# Patient Record
Sex: MALE | Race: OTHER | HISPANIC OR LATINO | Employment: FULL TIME | URBAN - METROPOLITAN AREA
[De-identification: names, ages, dates, MRNs, and addresses within clinical notes are randomized per-mention and may not be internally consistent; named-entity substitution may affect disease eponyms.]

---

## 2019-01-01 ENCOUNTER — APPOINTMENT (OUTPATIENT)
Dept: RADIOLOGY | Facility: MEDICAL CENTER | Age: 34
DRG: 132 | End: 2019-01-01
Attending: EMERGENCY MEDICINE

## 2019-01-01 ENCOUNTER — HOSPITAL ENCOUNTER (INPATIENT)
Facility: MEDICAL CENTER | Age: 34
LOS: 1 days | DRG: 132 | End: 2019-01-02
Attending: EMERGENCY MEDICINE | Admitting: PLASTIC SURGERY

## 2019-01-01 DIAGNOSIS — S02.609B: ICD-10-CM

## 2019-01-01 PROCEDURE — 501838 HCHG SUTURE GENERAL: Performed by: PLASTIC SURGERY

## 2019-01-01 PROCEDURE — 99285 EMERGENCY DEPT VISIT HI MDM: CPT

## 2019-01-01 PROCEDURE — 302135 SEQUENTIAL COMPRESSION MACHINE: Performed by: PLASTIC SURGERY

## 2019-01-01 PROCEDURE — 0NSV04Z REPOSITION LEFT MANDIBLE WITH INTERNAL FIXATION DEVICE, OPEN APPROACH: ICD-10-PCS | Performed by: PLASTIC SURGERY

## 2019-01-01 PROCEDURE — 160048 HCHG OR STATISTICAL LEVEL 1-5: Performed by: PLASTIC SURGERY

## 2019-01-01 PROCEDURE — 70486 CT MAXILLOFACIAL W/O DYE: CPT

## 2019-01-01 PROCEDURE — 3E0234Z INTRODUCTION OF SERUM, TOXOID AND VACCINE INTO MUSCLE, PERCUTANEOUS APPROACH: ICD-10-PCS | Performed by: EMERGENCY MEDICINE

## 2019-01-01 PROCEDURE — 700111 HCHG RX REV CODE 636 W/ 250 OVERRIDE (IP)

## 2019-01-01 PROCEDURE — 160009 HCHG ANES TIME/MIN: Performed by: PLASTIC SURGERY

## 2019-01-01 PROCEDURE — 700102 HCHG RX REV CODE 250 W/ 637 OVERRIDE(OP): Performed by: PLASTIC SURGERY

## 2019-01-01 PROCEDURE — G0378 HOSPITAL OBSERVATION PER HR: HCPCS

## 2019-01-01 PROCEDURE — 96365 THER/PROPH/DIAG IV INF INIT: CPT

## 2019-01-01 PROCEDURE — C1713 ANCHOR/SCREW BN/BN,TIS/BN: HCPCS | Performed by: PLASTIC SURGERY

## 2019-01-01 PROCEDURE — 700111 HCHG RX REV CODE 636 W/ 250 OVERRIDE (IP): Performed by: PLASTIC SURGERY

## 2019-01-01 PROCEDURE — 700105 HCHG RX REV CODE 258: Performed by: PLASTIC SURGERY

## 2019-01-01 PROCEDURE — 700111 HCHG RX REV CODE 636 W/ 250 OVERRIDE (IP): Performed by: EMERGENCY MEDICINE

## 2019-01-01 PROCEDURE — 770006 HCHG ROOM/CARE - MED/SURG/GYN SEMI*

## 2019-01-01 PROCEDURE — 160036 HCHG PACU - EA ADDL 30 MINS PHASE I: Performed by: PLASTIC SURGERY

## 2019-01-01 PROCEDURE — 90715 TDAP VACCINE 7 YRS/> IM: CPT | Performed by: EMERGENCY MEDICINE

## 2019-01-01 PROCEDURE — 90471 IMMUNIZATION ADMIN: CPT

## 2019-01-01 PROCEDURE — 160002 HCHG RECOVERY MINUTES (STAT): Performed by: PLASTIC SURGERY

## 2019-01-01 PROCEDURE — 700105 HCHG RX REV CODE 258: Performed by: EMERGENCY MEDICINE

## 2019-01-01 PROCEDURE — 160041 HCHG SURGERY MINUTES - EA ADDL 1 MIN LEVEL 4: Performed by: PLASTIC SURGERY

## 2019-01-01 PROCEDURE — 0NST04Z REPOSITION RIGHT MANDIBLE WITH INTERNAL FIXATION DEVICE, OPEN APPROACH: ICD-10-PCS | Performed by: PLASTIC SURGERY

## 2019-01-01 PROCEDURE — 160029 HCHG SURGERY MINUTES - 1ST 30 MINS LEVEL 4: Performed by: PLASTIC SURGERY

## 2019-01-01 PROCEDURE — 700101 HCHG RX REV CODE 250: Performed by: PLASTIC SURGERY

## 2019-01-01 PROCEDURE — 502000 HCHG MISC OR IMPLANTS RC 0278: Performed by: PLASTIC SURGERY

## 2019-01-01 PROCEDURE — A9270 NON-COVERED ITEM OR SERVICE: HCPCS | Performed by: PLASTIC SURGERY

## 2019-01-01 PROCEDURE — 700101 HCHG RX REV CODE 250

## 2019-01-01 PROCEDURE — 96375 TX/PRO/DX INJ NEW DRUG ADDON: CPT

## 2019-01-01 PROCEDURE — 160035 HCHG PACU - 1ST 60 MINS PHASE I: Performed by: PLASTIC SURGERY

## 2019-01-01 DEVICE — PLATE TI SHORT MATRIXWAVE MMF 10 HOLE (2TX6=12): Type: IMPLANTABLE DEVICE | Status: FUNCTIONAL

## 2019-01-01 DEVICE — SCREW TI SELF DRILLING MATRIX WAVE MMF 1.85MM (5EA/PK) (2TX60=120): Type: IMPLANTABLE DEVICE | Status: FUNCTIONAL

## 2019-01-01 DEVICE — PLATE TI TALL MATRIXWAVE MMF 10 HOLE (2TX6=12): Type: IMPLANTABLE DEVICE | Status: FUNCTIONAL

## 2019-01-01 DEVICE — SUTURE SIZE 1 MONO - (12TB/BX): Type: IMPLANTABLE DEVICE | Status: FUNCTIONAL

## 2019-01-01 RX ORDER — BUPIVACAINE HYDROCHLORIDE AND EPINEPHRINE 5; 5 MG/ML; UG/ML
INJECTION, SOLUTION EPIDURAL; INTRACAUDAL; PERINEURAL
Status: DISCONTINUED | OUTPATIENT
Start: 2019-01-01 | End: 2019-01-01 | Stop reason: HOSPADM

## 2019-01-01 RX ORDER — HYDROMORPHONE HYDROCHLORIDE 1 MG/ML
0.1 INJECTION, SOLUTION INTRAMUSCULAR; INTRAVENOUS; SUBCUTANEOUS
Status: DISCONTINUED | OUTPATIENT
Start: 2019-01-01 | End: 2019-01-01 | Stop reason: HOSPADM

## 2019-01-01 RX ORDER — CEFAZOLIN SODIUM 1 G/50ML
1 INJECTION, SOLUTION INTRAVENOUS ONCE
Status: COMPLETED | OUTPATIENT
Start: 2019-01-01 | End: 2019-01-01

## 2019-01-01 RX ORDER — OXYCODONE HCL 5 MG/5 ML
10 SOLUTION, ORAL ORAL
Status: DISCONTINUED | OUTPATIENT
Start: 2019-01-01 | End: 2019-01-01 | Stop reason: HOSPADM

## 2019-01-01 RX ORDER — HYDROMORPHONE HYDROCHLORIDE 1 MG/ML
0.5 INJECTION, SOLUTION INTRAMUSCULAR; INTRAVENOUS; SUBCUTANEOUS
Status: DISCONTINUED | OUTPATIENT
Start: 2019-01-01 | End: 2019-01-02 | Stop reason: HOSPADM

## 2019-01-01 RX ORDER — OXYCODONE HCL 5 MG/5 ML
5 SOLUTION, ORAL ORAL
Status: DISCONTINUED | OUTPATIENT
Start: 2019-01-01 | End: 2019-01-01 | Stop reason: HOSPADM

## 2019-01-01 RX ORDER — DEXTROSE MONOHYDRATE, SODIUM CHLORIDE, AND POTASSIUM CHLORIDE 50; 1.49; 4.5 G/1000ML; G/1000ML; G/1000ML
INJECTION, SOLUTION INTRAVENOUS EVERY 6 HOURS
Status: COMPLETED | OUTPATIENT
Start: 2019-01-01 | End: 2019-01-01

## 2019-01-01 RX ORDER — HALOPERIDOL 5 MG/ML
1 INJECTION INTRAMUSCULAR
Status: DISCONTINUED | OUTPATIENT
Start: 2019-01-01 | End: 2019-01-01 | Stop reason: HOSPADM

## 2019-01-01 RX ORDER — HYDROMORPHONE HYDROCHLORIDE 1 MG/ML
0.2 INJECTION, SOLUTION INTRAMUSCULAR; INTRAVENOUS; SUBCUTANEOUS
Status: DISCONTINUED | OUTPATIENT
Start: 2019-01-01 | End: 2019-01-01 | Stop reason: HOSPADM

## 2019-01-01 RX ORDER — ONDANSETRON 2 MG/ML
4 INJECTION INTRAMUSCULAR; INTRAVENOUS EVERY 6 HOURS PRN
Status: DISCONTINUED | OUTPATIENT
Start: 2019-01-01 | End: 2019-01-02 | Stop reason: HOSPADM

## 2019-01-01 RX ORDER — ONDANSETRON 2 MG/ML
4 INJECTION INTRAMUSCULAR; INTRAVENOUS
Status: DISCONTINUED | OUTPATIENT
Start: 2019-01-01 | End: 2019-01-01 | Stop reason: HOSPADM

## 2019-01-01 RX ORDER — DIPHENHYDRAMINE HYDROCHLORIDE 50 MG/ML
12.5 INJECTION INTRAMUSCULAR; INTRAVENOUS
Status: DISCONTINUED | OUTPATIENT
Start: 2019-01-01 | End: 2019-01-01 | Stop reason: HOSPADM

## 2019-01-01 RX ORDER — CHLORHEXIDINE GLUCONATE ORAL RINSE 1.2 MG/ML
15 SOLUTION DENTAL 4 TIMES DAILY
Status: DISCONTINUED | OUTPATIENT
Start: 2019-01-01 | End: 2019-01-02 | Stop reason: HOSPADM

## 2019-01-01 RX ORDER — DEXAMETHASONE SODIUM PHOSPHATE 4 MG/ML
4 INJECTION, SOLUTION INTRA-ARTICULAR; INTRALESIONAL; INTRAMUSCULAR; INTRAVENOUS; SOFT TISSUE
Status: DISCONTINUED | OUTPATIENT
Start: 2019-01-01 | End: 2019-01-02 | Stop reason: HOSPADM

## 2019-01-01 RX ORDER — MORPHINE SULFATE 4 MG/ML
4 INJECTION, SOLUTION INTRAMUSCULAR; INTRAVENOUS ONCE
Status: COMPLETED | OUTPATIENT
Start: 2019-01-01 | End: 2019-01-01

## 2019-01-01 RX ORDER — MORPHINE SULFATE 4 MG/ML
4 INJECTION, SOLUTION INTRAMUSCULAR; INTRAVENOUS EVERY 4 HOURS PRN
Status: DISCONTINUED | OUTPATIENT
Start: 2019-01-01 | End: 2019-01-01

## 2019-01-01 RX ORDER — KETOROLAC TROMETHAMINE 30 MG/ML
30 INJECTION, SOLUTION INTRAMUSCULAR; INTRAVENOUS EVERY 6 HOURS
Status: DISCONTINUED | OUTPATIENT
Start: 2019-01-01 | End: 2019-01-02 | Stop reason: HOSPADM

## 2019-01-01 RX ORDER — SODIUM CHLORIDE, SODIUM LACTATE, POTASSIUM CHLORIDE, CALCIUM CHLORIDE 600; 310; 30; 20 MG/100ML; MG/100ML; MG/100ML; MG/100ML
INJECTION, SOLUTION INTRAVENOUS CONTINUOUS
Status: DISCONTINUED | OUTPATIENT
Start: 2019-01-01 | End: 2019-01-01 | Stop reason: HOSPADM

## 2019-01-01 RX ORDER — SODIUM CHLORIDE 9 MG/ML
INJECTION, SOLUTION INTRAVENOUS
Status: ACTIVE
Start: 2019-01-01 | End: 2019-01-02

## 2019-01-01 RX ORDER — SODIUM CHLORIDE, SODIUM LACTATE, POTASSIUM CHLORIDE, CALCIUM CHLORIDE 600; 310; 30; 20 MG/100ML; MG/100ML; MG/100ML; MG/100ML
1000 INJECTION, SOLUTION INTRAVENOUS CONTINUOUS
Status: DISCONTINUED | OUTPATIENT
Start: 2019-01-01 | End: 2019-01-01

## 2019-01-01 RX ORDER — OXYCODONE HYDROCHLORIDE 10 MG/1
10 TABLET ORAL
Status: DISCONTINUED | OUTPATIENT
Start: 2019-01-01 | End: 2019-01-02 | Stop reason: HOSPADM

## 2019-01-01 RX ORDER — HYDROMORPHONE HYDROCHLORIDE 1 MG/ML
0.4 INJECTION, SOLUTION INTRAMUSCULAR; INTRAVENOUS; SUBCUTANEOUS
Status: DISCONTINUED | OUTPATIENT
Start: 2019-01-01 | End: 2019-01-01 | Stop reason: HOSPADM

## 2019-01-01 RX ADMIN — ONDANSETRON 4 MG: 2 INJECTION INTRAMUSCULAR; INTRAVENOUS at 21:04

## 2019-01-01 RX ADMIN — SODIUM CHLORIDE 3 G: 900 INJECTION INTRAVENOUS at 17:28

## 2019-01-01 RX ADMIN — SODIUM CHLORIDE 3 G: 900 INJECTION INTRAVENOUS at 13:19

## 2019-01-01 RX ADMIN — SODIUM CHLORIDE 3 G: 900 INJECTION INTRAVENOUS at 23:46

## 2019-01-01 RX ADMIN — SODIUM CHLORIDE, POTASSIUM CHLORIDE, SODIUM LACTATE AND CALCIUM CHLORIDE 1000 ML: 600; 310; 30; 20 INJECTION, SOLUTION INTRAVENOUS at 04:27

## 2019-01-01 RX ADMIN — OXYCODONE HYDROCHLORIDE 10 MG: 10 TABLET ORAL at 15:27

## 2019-01-01 RX ADMIN — POTASSIUM CHLORIDE, DEXTROSE MONOHYDRATE AND SODIUM CHLORIDE: 150; 5; 450 INJECTION, SOLUTION INTRAVENOUS at 12:00

## 2019-01-01 RX ADMIN — CHLORHEXIDINE GLUCONATE 15 ML: 1.2 RINSE ORAL at 19:51

## 2019-01-01 RX ADMIN — CHLORHEXIDINE GLUCONATE 15 ML: 1.2 RINSE ORAL at 13:18

## 2019-01-01 RX ADMIN — MORPHINE SULFATE 4 MG: 4 INJECTION INTRAVENOUS at 03:29

## 2019-01-01 RX ADMIN — CLOSTRIDIUM TETANI TOXOID ANTIGEN (FORMALDEHYDE INACTIVATED), CORYNEBACTERIUM DIPHTHERIAE TOXOID ANTIGEN (FORMALDEHYDE INACTIVATED), BORDETELLA PERTUSSIS TOXOID ANTIGEN (GLUTARALDEHYDE INACTIVATED), BORDETELLA PERTUSSIS FILAMENTOUS HEMAGGLUTININ ANTIGEN (FORMALDEHYDE INACTIVATED), BORDETELLA PERTUSSIS PERTACTIN ANTIGEN, AND BORDETELLA PERTUSSIS FIMBRIAE 2/3 ANTIGEN 0.5 ML: 5; 2; 2.5; 5; 3; 5 INJECTION, SUSPENSION INTRAMUSCULAR at 04:23

## 2019-01-01 RX ADMIN — CEFAZOLIN SODIUM 1 G: 1 INJECTION, SOLUTION INTRAVENOUS at 03:40

## 2019-01-01 RX ADMIN — CHLORHEXIDINE GLUCONATE 15 ML: 1.2 RINSE ORAL at 17:29

## 2019-01-01 RX ADMIN — OXYCODONE HYDROCHLORIDE 10 MG: 10 TABLET ORAL at 21:04

## 2019-01-01 RX ADMIN — KETOROLAC TROMETHAMINE 30 MG: 30 INJECTION, SOLUTION INTRAMUSCULAR at 17:29

## 2019-01-01 RX ADMIN — KETOROLAC TROMETHAMINE 30 MG: 30 INJECTION, SOLUTION INTRAMUSCULAR at 23:47

## 2019-01-01 ASSESSMENT — COGNITIVE AND FUNCTIONAL STATUS - GENERAL
MOVING TO AND FROM BED TO CHAIR: A LITTLE
MOBILITY SCORE: 18
TURNING FROM BACK TO SIDE WHILE IN FLAT BAD: A LITTLE
PERSONAL GROOMING: A LITTLE
SUGGESTED CMS G CODE MODIFIER MOBILITY: CK
DAILY ACTIVITIY SCORE: 18
DRESSING REGULAR UPPER BODY CLOTHING: A LITTLE
DRESSING REGULAR LOWER BODY CLOTHING: A LITTLE
SUGGESTED CMS G CODE MODIFIER DAILY ACTIVITY: CK
MOVING FROM LYING ON BACK TO SITTING ON SIDE OF FLAT BED: A LITTLE
CLIMB 3 TO 5 STEPS WITH RAILING: A LITTLE
STANDING UP FROM CHAIR USING ARMS: A LITTLE
HELP NEEDED FOR BATHING: A LITTLE
TOILETING: A LITTLE
WALKING IN HOSPITAL ROOM: A LITTLE
EATING MEALS: A LITTLE

## 2019-01-01 ASSESSMENT — COPD QUESTIONNAIRES
COPD SCREENING SCORE: 0
DO YOU EVER COUGH UP ANY MUCUS OR PHLEGM?: NO/ONLY WITH OCCASIONAL COLDS OR INFECTIONS
DURING THE PAST 4 WEEKS HOW MUCH DID YOU FEEL SHORT OF BREATH: NONE/LITTLE OF THE TIME
IN THE PAST 12 MONTHS DO YOU DO LESS THAN YOU USED TO BECAUSE OF YOUR BREATHING PROBLEMS: DISAGREE/UNSURE
HAVE YOU SMOKED AT LEAST 100 CIGARETTES IN YOUR ENTIRE LIFE: NO/DON'T KNOW

## 2019-01-01 ASSESSMENT — PATIENT HEALTH QUESTIONNAIRE - PHQ9
2. FEELING DOWN, DEPRESSED, IRRITABLE, OR HOPELESS: NOT AT ALL
SUM OF ALL RESPONSES TO PHQ9 QUESTIONS 1 AND 2: 0
1. LITTLE INTEREST OR PLEASURE IN DOING THINGS: NOT AT ALL
SUM OF ALL RESPONSES TO PHQ9 QUESTIONS 1 AND 2: 0
2. FEELING DOWN, DEPRESSED, IRRITABLE, OR HOPELESS: NOT AT ALL
1. LITTLE INTEREST OR PLEASURE IN DOING THINGS: NOT AT ALL

## 2019-01-01 ASSESSMENT — ENCOUNTER SYMPTOMS
HEADACHES: 0
ABDOMINAL PAIN: 0
NAUSEA: 0
TINGLING: 0
LOSS OF CONSCIOUSNESS: 0
SORE THROAT: 0
VOMITING: 0

## 2019-01-01 ASSESSMENT — LIFESTYLE VARIABLES
EVER_SMOKED: NEVER
DO YOU DRINK ALCOHOL: NO

## 2019-01-01 ASSESSMENT — PAIN SCALES - GENERAL
PAINLEVEL_OUTOF10: 3
PAINLEVEL_OUTOF10: 0
PAINLEVEL_OUTOF10: 3
PAINLEVEL_OUTOF10: 6
PAINLEVEL_OUTOF10: 7
PAINLEVEL_OUTOF10: 0
PAINLEVEL_OUTOF10: 3
PAINLEVEL_OUTOF10: 0
PAINLEVEL_OUTOF10: 5
PAINLEVEL_OUTOF10: 8
PAINLEVEL_OUTOF10: 5

## 2019-01-01 NOTE — PROGRESS NOTES
Patient arrived to floor and place in room. Patient refused to answer questions for admission due to mouth hurting. Rn explained to patient that he is not to eat or drink anything due to surgery being planned. Patient expresses dissatisfaction with current NPO status. Patient was instructed to call before getting out of bed or needs by using the call light. Patient states understanding.

## 2019-01-01 NOTE — ED NOTES
ERP notified patient at bedside that patient has been accepted by  who will be doing surgery today. Pt verbalized understanding of POC. Continue to monitor.

## 2019-01-01 NOTE — ED TRIAGE NOTES
This RN brought patient back from waiting room. Pt is alert, oriented and ambulatory with steady gait. Pt updated on POC. Pt verbalized understanding.

## 2019-01-01 NOTE — PROGRESS NOTES
Pt seen and examined  Full note is dictated  Pt is scheduled for interdental fixation later this AM, he has had his questions answered
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
## 2019-01-01 NOTE — OR SURGEON
Immediate Post OP Note    PreOp Diagnosis: open fracture of bilateral mandible angles    PostOp Diagnosis: same    Procedure(s):  Open reduction of mandible fracture with interdental fixation  Surgeon(s):  Iron Carroll M.D.    Anesthesiologist/Type of Anesthesia:  Anesthesiologist: Rigo Son M.D./General    Surgical Staff:  Circulator: Amaury Bansal R.N.  Scrub Person: Jose Solano    Specimens removed if any:  * No specimens in log *    Estimated Blood Loss: 10 ml    Findings: displaced overlapping bone    Complications: none        1/1/2019 10:46 AM Iron Carroll M.D.

## 2019-01-01 NOTE — OR NURSING
Patient transported to T304-1 with transport and this RN. Wire cutters on chart, portable suction on bed. Transport uneventful, bedside handoff completed with Narda RN
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
## 2019-01-01 NOTE — PROGRESS NOTES
Report received from FELIPE Krishna  Pt arrived on the floor. Full assessment complete.  in place. Suction at bedside, pt able to demonstrate proper use. Wire cutters placed in visible area, education done on use with pt. Post op VSs implemented

## 2019-01-01 NOTE — OR NURSING
Report called to Narda WANG. Plan of care discussed. Patient denies pain or nausea at this time. Suction remains in hand. Tolerating water without difficulty. Oxymask in place, HOB elevated, jaw bra in place. Patient states all needs are met at this time. VSS. Patient expresses desire to return to room.

## 2019-01-01 NOTE — OR NURSING
Jaw bra placed on patient. Suction set up, in hand of patient. Demonstrated proper use. Patient verbalized understanding. Patient tolerated water without difficulty. No complaints of nausea. Wire clippers taped to front of chart. Patient with HOB in upright position, oxymask in place.
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
## 2019-01-02 VITALS
SYSTOLIC BLOOD PRESSURE: 129 MMHG | HEART RATE: 72 BPM | TEMPERATURE: 98.1 F | HEIGHT: 67 IN | DIASTOLIC BLOOD PRESSURE: 80 MMHG | WEIGHT: 192.9 LBS | RESPIRATION RATE: 17 BRPM | OXYGEN SATURATION: 95 % | BODY MASS INDEX: 30.28 KG/M2

## 2019-01-02 PROCEDURE — 700111 HCHG RX REV CODE 636 W/ 250 OVERRIDE (IP): Performed by: PLASTIC SURGERY

## 2019-01-02 PROCEDURE — 700105 HCHG RX REV CODE 258: Performed by: PLASTIC SURGERY

## 2019-01-02 PROCEDURE — A9270 NON-COVERED ITEM OR SERVICE: HCPCS | Performed by: PLASTIC SURGERY

## 2019-01-02 PROCEDURE — 700102 HCHG RX REV CODE 250 W/ 637 OVERRIDE(OP): Performed by: PLASTIC SURGERY

## 2019-01-02 RX ORDER — CHLORHEXIDINE GLUCONATE ORAL RINSE 1.2 MG/ML
15 SOLUTION DENTAL 4 TIMES DAILY
Qty: 1 BOTTLE | Refills: 3 | Status: ON HOLD | OUTPATIENT
Start: 2019-01-02 | End: 2019-02-11

## 2019-01-02 RX ORDER — CLINDAMYCIN HYDROCHLORIDE 300 MG/1
300 CAPSULE ORAL 4 TIMES DAILY
Qty: 28 CAP | Refills: 0 | Status: ON HOLD | OUTPATIENT
Start: 2019-01-02 | End: 2019-02-11

## 2019-01-02 RX ORDER — OXYCODONE HYDROCHLORIDE 10 MG/1
10 TABLET ORAL EVERY 4 HOURS PRN
Qty: 40 TAB | Refills: 0 | Status: SHIPPED | OUTPATIENT
Start: 2019-01-02 | End: 2019-01-12

## 2019-01-02 RX ADMIN — KETOROLAC TROMETHAMINE 30 MG: 30 INJECTION, SOLUTION INTRAMUSCULAR at 05:39

## 2019-01-02 RX ADMIN — ENOXAPARIN SODIUM 30 MG: 100 INJECTION SUBCUTANEOUS at 10:56

## 2019-01-02 RX ADMIN — SODIUM CHLORIDE 3 G: 900 INJECTION INTRAVENOUS at 05:39

## 2019-01-02 RX ADMIN — CHLORHEXIDINE GLUCONATE 15 ML: 1.2 RINSE ORAL at 10:55

## 2019-01-02 RX ADMIN — KETOROLAC TROMETHAMINE 30 MG: 30 INJECTION, SOLUTION INTRAMUSCULAR at 11:32

## 2019-01-02 RX ADMIN — OXYCODONE HYDROCHLORIDE 10 MG: 10 TABLET ORAL at 11:36

## 2019-01-02 RX ADMIN — OXYCODONE HYDROCHLORIDE 10 MG: 10 TABLET ORAL at 05:39

## 2019-01-02 ASSESSMENT — PAIN SCALES - GENERAL
PAINLEVEL_OUTOF10: 3
PAINLEVEL_OUTOF10: 5
PAINLEVEL_OUTOF10: 3

## 2019-01-02 ASSESSMENT — PATIENT HEALTH QUESTIONNAIRE - PHQ9
SUM OF ALL RESPONSES TO PHQ9 QUESTIONS 1 AND 2: 0
1. LITTLE INTEREST OR PLEASURE IN DOING THINGS: NOT AT ALL
2. FEELING DOWN, DEPRESSED, IRRITABLE, OR HOPELESS: NOT AT ALL

## 2019-01-02 NOTE — DISCHARGE INSTRUCTIONS
Discharge Instructions    Discharged to home by car with friend. Discharged via wheelchair, hospital escort: Yes.  Special equipment needed: Not Applicable    Be sure to schedule a follow-up appointment with your primary care doctor or any specialists as instructed.     Discharge Plan:   Diet Plan: Discussed  Activity Level: Discussed  Confirmed Follow up Appointment: Patient to Call and Schedule Appointment  Confirmed Symptoms Management: Discussed  Medication Reconciliation Updated: Yes  Influenza Vaccine Indication: Patient Refuses    I understand that a diet low in cholesterol, fat, and sodium is recommended for good health. Unless I have been given specific instructions below for another diet, I accept this instruction as my diet prescription.   Other diet: full liquids for 6 weeks    Special Instructions: Discharge instructions for the Orthopedic Patient    Follow up with:          *Primary Care Physician within 2 weeks of discharge to home, regarding:                  Review of medications and diagnostic testing.                  Surveillance for medical complications.  *Dr. Carroll: within one week for follow up  *No heavy lifting  *Light duty for work for 6 weeks or until Dr. Carroll states otherwise  *Jaw wired shut for 6 weeks  *Full liquids only  *Emergency taina with you at all times  *Brush teeth after meals  *Rinse with antiseptic mouth rinse provided by Dr. Carroll  *Okay to take tylenol for pain        Mandibular Fracture   A mandibular fracture is a break in the jawbone. Surgery is often needed to put the jaw back in the right position. Wires may be placed around the teeth to hold the jaw in place while it heals.  HOME CARE  · Put ice on the injured area.  ¨ Put ice in a plastic bag.  ¨ Place a towel between your skin and the bag.  ¨ Leave the ice on for 15 to 20 minutes, 3 to 4 times a day. Do this for the first 2 days.  · Only take medicines as told by your doctor.  · Eat soft or liquid foods as told by  your doctor. Eat plenty of protein.  · If your jaws are wired, follow your doctor's directions for wired jaw care.  · Sleep on your back to avoid putting pressure on your jaw.  · Avoid exercising so hard that you become short of breath.  GET HELP RIGHT AWAY IF:  · You have a fever.  · You have trouble breathing.  · You feel like your airway is tight.  · You cannot swallow your spit (saliva).  · You make a high-pitched whistling sound when you breathe (wheezing).  · You have a bad headache or lose feeling in your face (numbness).  · You have bad jaw pain that does not get better with medicine.  · Your jaw wires become loose.  · You feel sick to your stomach (nauseous) or worried (anxious).  · Your puffiness (swelling) or redness gets worse.  MAKE SURE YOU:  · Understand these instructions.  · Will watch your condition.  · Will get help right away if you are not doing well or get worse.  This information is not intended to replace advice given to you by your health care provider. Make sure you discuss any questions you have with your health care provider.  Document Released: 03/11/2013 Document Reviewed: 09/11/2016  [employer] Interactive Patient Education © 2017 [employer]          Wired Jaw Care  You may have your jaw wired shut for many reasons, including a broken jaw or jaw surgery. The wires help hold your jaw in place while you heal.  HOW TO CARE FOR YOUR WIRED JAW  Keep your mouth clean.   · Rinse your mouth with warm salt water after eating or drinking anything. To make salt water, mix ½ tsp of salt in one cup of warm water.    · Brush the front of your teeth with a child-sized, soft toothbrush after you eat.  · If you need to vomit, bend over and open your lips. Always rinse out your mouth and brush your teeth after vomiting.  Take care of swelling.   · Follow your health care provider's instructions about how to help the swelling go down.  · Sit up or prop yourself up with pillows behind your back to help  with swelling.  Take care of pain and discomfort.   · Do not drive or operate heavy machinery while taking pain medicine.  · Use petroleum jelly on your lips to keep them from drying and cracking.  · Cover the wire with dental wax if any wires are poking into your lips or gums.    Follow your health care provider's instructions.   · Follow your health care provider's directions about what you can and cannot eat.  · Take medicines only as directed by your health care provider.  · Keep all follow-up visits as told by your health care provider. This is important.  Only cut wires in an emergency.  · Keep wire cutters with you at all times. Use them only in an emergency to cut the wires that hold your jaw together.  · Do not cut the wires:  ¨ Even if you are tired of having your jaw wired.  ¨ Even if you are hungry.  ¨ Even if you need to vomit.  · You may cut the wires that hold your jaw together only:  ¨ If you have trouble breathing.  ¨ If you are choking.  · Do not cut the wires that connect to your back teeth (arch wires). If you must cut the wires in an emergency, cut straight across the wires that hold your mouth closed. These are the wires that are connected to the arch wires.  SEEK MEDICAL CARE IF:  · You have a fever.  · You feel nauseous or you vomit.  · You feel that one or more wires have broken.  · You have fluid, blood, or pus coming from your mouth or incisions.  · You are dizzy.  SEEK IMMEDIATE MEDICAL CARE IF:  · You had to cut the wires that hold your jaw together.  · Your pain is severe and is not helped with medicine.  · You faint.     This information is not intended to replace advice given to you by your health care provider. Make sure you discuss any questions you have with your health care provider.     Document Released: 09/26/2009 Document Revised: 01/08/2016 Document Reviewed: 05/21/2015  [employer] Interactive Patient Education ©2016 [employer]      Depression / Suicide Risk    As you are  discharged from this [facility], it is important to learn how to keep safe from harming yourself.    Recognize the warning signs:  · Abrupt changes in personality, positive or negative- including increase in energy   · Giving away possessions  · Change in eating patterns- significant weight changes-  positive or negative  · Change in sleeping patterns- unable to sleep or sleeping all the time   · Unwillingness or inability to communicate  · Depression  · Unusual sadness, discouragement and loneliness  · Talk of wanting to die  · Neglect of personal appearance   · Rebelliousness- reckless behavior  · Withdrawal from people/activities they love  · Confusion- inability to concentrate     If you or a loved one observes any of these behaviors or has concerns about self-harm, here's what you can do:  · Talk about it- your feelings and reasons for harming yourself  · Remove any means that you might use to hurt yourself (examples: pills, rope, extension cords, firearm)  · Get professional help from the community (Mental Health, Substance Abuse, psychological counseling)  · Do not be alone:Call your Safe Contact- someone whom you trust who will be there for you.  · Call your local CRISIS HOTLINE 582-0058 or 456-296-9075  · Call your local Children's Mobile Crisis Response Team [place] (058) 799-3907 or www.RichRelevance  · Call the toll free National Suicide Prevention Hotlines   · National Suicide Prevention Lifeline 085-864-GZWQ (7943)  · National Hope Line Network 800-SUICIDE (502-0941)

## 2019-01-02 NOTE — PROGRESS NOTES
Full assessment complete.  in place. Suction at bedside, pt able to demonstrate proper use. Wire cutters placed in visible area, education done on use with pt

## 2019-01-02 NOTE — PROGRESS NOTES
Pt is AAOx4  Pt reports a 5/10 pain level  Medicated per MAR  VS WNL  POC discussed  All needs met at this time  Bed in low position  Call light within reach  Rounding in place

## 2019-01-02 NOTE — PROGRESS NOTES
POD 1  No events  Pain is controlled  Tolerating diet  Operative site is without complication  Occlusion is good  DC home

## 2019-01-02 NOTE — CARE PLAN
Problem: Pain Management  Goal: Pain level will decrease to patient's comfort goal  Outcome: PROGRESSING AS EXPECTED  Medicated patient per MAR for pain. Encouraged multimodal interventions as well such as heat, imagery, positioning, and relaxation    Problem: Safety  Goal: Will remain free from falls  Outcome: PROGRESSING AS EXPECTED   Treaded socks in place, bed in the lowest position, bed alarm not indicated, call light and belongings within reach, pt call for assistance appropriately

## 2019-01-02 NOTE — CARE PLAN
Problem: Safety  Goal: Will remain free from injury  Outcome: PROGRESSING AS EXPECTED  All fall precautions in place,  hourly rounding complete, call light and personal belongings kept within reach at all times. Education done with pt on importance of calling before getting OOB, pt verbalizes an understanding. Will continue to monitor.     Problem: Knowledge Deficit  Goal: Knowledge of disease process/condition, treatment plan, diagnostic tests, and medications will improve    Intervention: Assess knowledge level of disease process/condition, treatment plan, diagnostic tests, and medications  Education done on POC, including diet, how to use wire cutters and pain control, all questions and concerns were addressed.

## 2019-01-02 NOTE — CARE PLAN
Problem: Pain Management  Goal: Pain level will decrease to patient's comfort goal  PRN oxy given.  Med is crushed and dissolved in 5-10 ml of water.  Pt resting comfortably in bed.  Ice pack applied.  All needs are met at this time    Problem: Communication  Goal: The ability to communicate needs accurately and effectively will improve  Pt updated on POC.  Pt educated that HOB has to stay at least 30 degree at all times.  Wire cutters at the bedside.  Pt's call light within reach, pt able to make needs known

## 2019-01-03 NOTE — PROGRESS NOTES
Pt DC'd with all belongings at 1145 without event. Patient left with prescriptions and states all questions answered

## 2019-02-11 ENCOUNTER — HOSPITAL ENCOUNTER (OUTPATIENT)
Facility: MEDICAL CENTER | Age: 34
End: 2019-02-11
Attending: PLASTIC SURGERY | Admitting: PLASTIC SURGERY

## 2019-02-11 VITALS
TEMPERATURE: 98 F | OXYGEN SATURATION: 96 % | RESPIRATION RATE: 20 BRPM | HEART RATE: 68 BPM | BODY MASS INDEX: 24.6 KG/M2 | WEIGHT: 156.75 LBS | HEIGHT: 67 IN

## 2019-02-11 PROCEDURE — 700111 HCHG RX REV CODE 636 W/ 250 OVERRIDE (IP)

## 2019-02-11 PROCEDURE — 160002 HCHG RECOVERY MINUTES (STAT): Performed by: PLASTIC SURGERY

## 2019-02-11 PROCEDURE — 160048 HCHG OR STATISTICAL LEVEL 1-5: Performed by: PLASTIC SURGERY

## 2019-02-11 PROCEDURE — 160025 RECOVERY II MINUTES (STATS): Performed by: PLASTIC SURGERY

## 2019-02-11 PROCEDURE — 160035 HCHG PACU - 1ST 60 MINS PHASE I: Performed by: PLASTIC SURGERY

## 2019-02-11 PROCEDURE — 160028 HCHG SURGERY MINUTES - 1ST 30 MINS LEVEL 3: Performed by: PLASTIC SURGERY

## 2019-02-11 PROCEDURE — 160046 HCHG PACU - 1ST 60 MINS PHASE II: Performed by: PLASTIC SURGERY

## 2019-02-11 PROCEDURE — 160009 HCHG ANES TIME/MIN: Performed by: PLASTIC SURGERY

## 2019-02-11 RX ORDER — HYDROMORPHONE HYDROCHLORIDE 1 MG/ML
0.1 INJECTION, SOLUTION INTRAMUSCULAR; INTRAVENOUS; SUBCUTANEOUS
Status: DISCONTINUED | OUTPATIENT
Start: 2019-02-11 | End: 2019-02-11 | Stop reason: HOSPADM

## 2019-02-11 RX ORDER — SODIUM CHLORIDE, SODIUM LACTATE, POTASSIUM CHLORIDE, CALCIUM CHLORIDE 600; 310; 30; 20 MG/100ML; MG/100ML; MG/100ML; MG/100ML
INJECTION, SOLUTION INTRAVENOUS CONTINUOUS
Status: DISCONTINUED | OUTPATIENT
Start: 2019-02-11 | End: 2019-02-11 | Stop reason: HOSPADM

## 2019-02-11 RX ORDER — MEPERIDINE HYDROCHLORIDE 25 MG/ML
6.25 INJECTION INTRAMUSCULAR; INTRAVENOUS; SUBCUTANEOUS
Status: DISCONTINUED | OUTPATIENT
Start: 2019-02-11 | End: 2019-02-11 | Stop reason: HOSPADM

## 2019-02-11 RX ORDER — OXYCODONE HCL 5 MG/5 ML
5 SOLUTION, ORAL ORAL
Status: DISCONTINUED | OUTPATIENT
Start: 2019-02-11 | End: 2019-02-11 | Stop reason: HOSPADM

## 2019-02-11 RX ORDER — HYDROMORPHONE HYDROCHLORIDE 1 MG/ML
0.2 INJECTION, SOLUTION INTRAMUSCULAR; INTRAVENOUS; SUBCUTANEOUS
Status: DISCONTINUED | OUTPATIENT
Start: 2019-02-11 | End: 2019-02-11 | Stop reason: HOSPADM

## 2019-02-11 RX ORDER — ONDANSETRON 2 MG/ML
4 INJECTION INTRAMUSCULAR; INTRAVENOUS
Status: DISCONTINUED | OUTPATIENT
Start: 2019-02-11 | End: 2019-02-11 | Stop reason: HOSPADM

## 2019-02-11 RX ORDER — SODIUM CHLORIDE, SODIUM LACTATE, POTASSIUM CHLORIDE, CALCIUM CHLORIDE 600; 310; 30; 20 MG/100ML; MG/100ML; MG/100ML; MG/100ML
INJECTION, SOLUTION INTRAVENOUS ONCE
Status: COMPLETED | OUTPATIENT
Start: 2019-02-11 | End: 2019-02-11

## 2019-02-11 RX ORDER — HYDROMORPHONE HYDROCHLORIDE 1 MG/ML
0.4 INJECTION, SOLUTION INTRAMUSCULAR; INTRAVENOUS; SUBCUTANEOUS
Status: DISCONTINUED | OUTPATIENT
Start: 2019-02-11 | End: 2019-02-11 | Stop reason: HOSPADM

## 2019-02-11 RX ORDER — HALOPERIDOL 5 MG/ML
1 INJECTION INTRAMUSCULAR
Status: DISCONTINUED | OUTPATIENT
Start: 2019-02-11 | End: 2019-02-11 | Stop reason: HOSPADM

## 2019-02-11 RX ORDER — DIPHENHYDRAMINE HYDROCHLORIDE 50 MG/ML
12.5 INJECTION INTRAMUSCULAR; INTRAVENOUS
Status: DISCONTINUED | OUTPATIENT
Start: 2019-02-11 | End: 2019-02-11 | Stop reason: HOSPADM

## 2019-02-11 RX ORDER — OXYCODONE HCL 5 MG/5 ML
10 SOLUTION, ORAL ORAL
Status: DISCONTINUED | OUTPATIENT
Start: 2019-02-11 | End: 2019-02-11 | Stop reason: HOSPADM

## 2019-02-11 RX ADMIN — SODIUM CHLORIDE, SODIUM LACTATE, POTASSIUM CHLORIDE, CALCIUM CHLORIDE: 600; 310; 30; 20 INJECTION, SOLUTION INTRAVENOUS at 15:41

## 2019-02-12 NOTE — H&P
Surgery Plastic History & Physical Note    Date  2/11/2019    Primary Care Physician  Humza Maharaj M.D.    CC  Mandible fracture    HPI  This is a 33 y.o. male who presented with a mandible fracture, S/P interdental fixation    Past Medical History:   Diagnosis Date   • Snoring        Past Surgical History:   Procedure Laterality Date   • MANDIBLE FRACTURE ORIF  1/1/2019    Procedure: MANDIBLE FRACTURE ORIF FOR INTERDENTAL FIXATION;  Surgeon: Iron Carroll M.D.;  Location: SURGERY [facility];  Service: Plastics       No current facility-administered medications for this encounter.        Social History     Social History   • Marital status: Single     Spouse name: N/A   • Number of children: N/A   • Years of education: N/A     Occupational History   • Not on file.     Social History Main Topics   • Smoking status: Never Smoker   • Smokeless tobacco: Never Used   • Alcohol use Yes      Comment: 3/week   • Drug use: No   • Sexual activity: Not on file     Other Topics Concern   • Not on file     Social History Narrative   • No narrative on file       Family History   Problem Relation Age of Onset   • Cancer Maternal Grandfather         leukemia       Allergies  Patient has no known allergies.    Review of Systems  negative    Physical Exam    Vital Signs      Temperature: 37 °C (98.6 °F)   Pulse: 69   Respiration: 16   Pulse Oximetry: 95 %     Good occlusion  Lungs are clear  Heart is RR    Labs: none                    Radiology:  No orders to display         Assessment/Plan:  Mandible fracture  Procedure(s):  ARCH BAR REMOVAL OR REPAIR- FOR INTERDENTAL FIXATION AND HARDWARE

## 2019-02-12 NOTE — DISCHARGE INSTRUCTIONS
ACTIVITY: Rest and take it easy for the first 24 hours.  A responsible adult is recommended to remain with you during that time.  It is normal to feel sleepy.  We encourage you to not do anything that requires balance, judgment or coordination.    MILD FLU-LIKE SYMPTOMS ARE NORMAL. YOU MAY EXPERIENCE GENERALIZED MUSCLE ACHES, THROAT IRRITATION, HEADACHE AND/OR SOME NAUSEA.    FOR 24 HOURS DO NOT:  Drive, operate machinery or run household appliances.  Drink beer or alcoholic beverages.   Make important decisions or sign legal documents.    SPECIAL INSTRUCTIONS: *FOLLOW DR LEON'S INSTRUCTIONS    DIET: To avoid nausea, slowly advance diet as tolerated, avoiding spicy or greasy foods for the first day.  Add more substantial food to your diet according to your physician's instructions.  Babies can be fed formula or breast milk as soon as they are hungry.  INCREASE FLUIDS AND FIBER TO AVOID CONSTIPATION.  Advance diet from full liquid to regular diet slowly over the next 3 weeks    SURGICAL DRESSING/BATHING: *NO RESTRICTIONS    FOLLOW-UP APPOINTMENT:  A follow-up appointment should be arranged with your doctor in *1 WEEK**; call to schedule.    You should CALL YOUR PHYSICIAN if you develop:  Fever greater than 101 degrees F.  Pain not relieved by medication, or persistent nausea or vomiting.  Excessive bleeding (blood soaking through dressing) or unexpected drainage from the wound.  Extreme redness or swelling around the incision site, drainage of pus or foul smelling drainage.  Inability to urinate or empty your bladder within 8 hours.  Problems with breathing or chest pain.    You should call 911 if you develop problems with breathing or chest pain.  If you are unable to contact your doctor or surgical center, you should go to the nearest emergency room or urgent care center.  Physician's telephone #: **[phone]*    If any questions arise, call your doctor.  If your doctor is not available, please feel free to  call the Surgical Center at [phone].  The Center is open Monday through Friday from 7AM to 7PM.  You can also call the HEALTH HOTLINE open 24 hours/day, 7 days/week and speak to a nurse at [phone], or toll free at [phone].    A registered nurse may call you a few days after your surgery to see how you are doing after your procedure.    MEDICATIONS: Resume taking daily medication.  Take prescribed pain medication with food.  If no medication is prescribed, you may take non-aspirin pain medication if needed.  PAIN MEDICATION CAN BE VERY CONSTIPATING.  Take a stool softener or laxative such as senokot, pericolace, or milk of magnesia if needed.    Prescription given for *NONE**.  Last pain medication given at *NONE GIVEN**.    If your physician has prescribed pain medication that includes Acetaminophen (Tylenol), do not take additional Acetaminophen (Tylenol) while taking the prescribed medication.    Depression / Suicide Risk    As you are discharged from this [facility] facility, it is important to learn how to keep safe from harming yourself.    Recognize the warning signs:  · Abrupt changes in personality, positive or negative- including increase in energy   · Giving away possessions  · Change in eating patterns- significant weight changes-  positive or negative  · Change in sleeping patterns- unable to sleep or sleeping all the time   · Unwillingness or inability to communicate  · Depression  · Unusual sadness, discouragement and loneliness  · Talk of wanting to die  · Neglect of personal appearance   · Rebelliousness- reckless behavior  · Withdrawal from people/activities they love  · Confusion- inability to concentrate     If you or a loved one observes any of these behaviors or has concerns about self-harm, here's what you can do:  · Talk about it- your feelings and reasons for harming yourself  · Remove any means that you might use to hurt yourself (examples: pills, rope, extension  cords, firearm)  · Get professional help from the community (Mental Health, Substance Abuse, psychological counseling)  · Do not be alone:Call your Safe Contact- someone whom you trust who will be there for you.  · Call your local CRISIS HOTLINE 384-0929 or 371-193-3297  · Call your local Children's Mobile Crisis Response Team [place] (236) 091-9288 or www.[employer]  · Call the toll free National Suicide Prevention Hotlines   · National Suicide Prevention Lifeline 895-083-GJIN (0338)  · National Hope Line Network 800-SUICIDE (146-1169)

## 2019-02-12 NOTE — OR NURSING
1649-Received via gurney from OR.  Arousing.  RR even, unlabored.  Denies discomfort    1700-Resting.  States pain 3/10, denies need for medication    1710-Taking PO fluids.  Family not in WR-states visiting another patient, and will return    1720-Resting    1730-Family in.  Dressed for DC.  Up to chair.  Gait steady    1740-DC instructions discussed & signed    1745-DC ambulatory to car.  States comfort.  Has medications at home if he needs it.

## 2019-02-12 NOTE — OP REPORT
DATE OF SERVICE:  02/11/2019    PREOPERATIVE DIAGNOSIS:  Mandible fracture.    POSTOPERATIVE DIAGNOSIS:  Mandible fracture.    PROCEDURE PERFORMED:  Removal of interdental fixation and associated hardware.    SURGEON:  Iron Carroll MD    ASSISTANT:  None.    ANESTHESIOLOGIST:  Reji Stock MD    OPERATIVE INDICATIONS:  A 33-year-old gentleman status post mandible fracture.    He has been interdental fixation for 6 weeks.  The patient understands the   risks, benefits, and alternatives of proceeding with removal of his   interdental fixation and agrees to proceed.    OPERATIVE DESCRIPTION:  After induction of IV sedation, the patient's   interdental fixation wires were clipped.  The jaw was stressed at the fracture   site and was found to be stable.  Range of motion was acceptable and   occlusion was judged to be good.  With that in mind, the [employer] Matrix[employer]AVE   system was removed by removing the screws.  Both the maxillary hardware and   mandibular hardware were easily removed.  The patient was taken to the   recovery room in stable condition.       ____________________________________     IRON CARROLL MD    SWDRAKE / NTS    DD:  02/11/2019 16:14:35  DT:  02/11/2019 16:41:03    D#:  [id number]  Job#:  [id number]

## 2021-04-13 ENCOUNTER — NON-PROVIDER VISIT (OUTPATIENT)
Dept: URGENT CARE | Facility: PHYSICIAN GROUP | Age: 36
End: 2021-04-13

## 2021-04-13 DIAGNOSIS — Z02.1 PRE-EMPLOYMENT DRUG SCREENING: ICD-10-CM

## 2021-04-13 LAB
AMP AMPHETAMINE: NORMAL
COC COCAINE: NORMAL
INT CON NEG: NEGATIVE
INT CON POS: POSITIVE
MET METHAMPHETAMINES: NORMAL
OPI OPIATES: NORMAL
PCP PHENCYCLIDINE: NORMAL
POC DRUG COMMENT 753798-OCCUPATIONAL HEALTH: NORMAL
THC: NORMAL

## 2021-04-13 PROCEDURE — 80305 DRUG TEST PRSMV DIR OPT OBS: CPT | Performed by: NURSE PRACTITIONER

## 2023-10-18 NOTE — DISCHARGE SUMMARY
Called mom back, no answer.  LM informing mom that provider does not test for allergies and that she should contact PCP to request referral to allergist.  Asked mom to call back if she has additional questions.    DATE OF ADMISSION:  01/01/2019    DATE OF DISCHARGE:  01/02/2019    ADMISSION DIAGNOSIS:  Bilateral fracture of the mandible, open.    HOSPITAL NARRATIVE:  On the day of admission, the patient was admitted to the   hospital for pain control and IV antibiotics.  When appropriate, he was   scheduled for operative treatment of his mandible fracture.  This was done   successfully.  The patient received open reduction of his mandibular angle   fractures with interdental fixation.  Postoperatively, the patient did well.    On postoperative day #1, the patient was tolerating a liquid diet.  The   patient's pain was well controlled on his oral narcotics.  The patient did not   manifest any complications at his operative site and the patient expressed a   desire to be discharged from the hospital.    DISPOSITION:  To home.    MEDICATIONS AT THE TIME OF DISCHARGE:  Patient was given:  1.  Roxicodone 10 mg tablets 1 tab p.o. q. 4 hours p.r.n.  2.  Peridex 15 mL swish and spit q.i.d. and p.r.n.  3.  Clindamycin 300 mg capsules 1 capsule q.i.d.    SPECIAL INSTRUCTIONS:  The patient was told that he could not go back to work.    That he should not to perform any lifting or heavy duty.  The patient should   maintain elevation of the head of the bed.  The patient was instructed on   oral hygiene.  The patient was instructed to follow up this in 1 week in my   office and the patient has my contact information.       ____________________________________     MD COURTNEY MARTIENZ / YON    DD:  01/02/2019 10:45:24  DT:  01/02/2019 10:55:01    D#:  [id number]  Job#:  [id number]

## (undated) DEVICE — SET EXTENSION WITH 2 PORTS (48EA/CA) ***PART #2C8610 IS A SUBSTITUTE*****

## (undated) DEVICE — CHLORAPREP 26 ML APPLICATOR - ORANGE TINT(25/CA)

## (undated) DEVICE — TOOTHBRUSH ADULT (72EA/CA)

## (undated) DEVICE — COVER TABLE 44 X 90 - (22/CA)

## (undated) DEVICE — TRANSDUCER OXISENSOR PEDS O2 - (20EA/BX)

## (undated) DEVICE — KIT ANESTHESIA W/CIRCUIT & 3/LT BAG W/FILTER (20EA/CA)

## (undated) DEVICE — SENSOR SKIN TEMPERATURE - (30EA/BX 3BX/CS)

## (undated) DEVICE — SUCTION INSTRUMENT YANKAUER BULBOUS TIP W/O VENT (50EA/CA)

## (undated) DEVICE — CATHETER IV 20 GA X 1-1/4 ---SURG.& SDS ONLY--- (50EA/BX)

## (undated) DEVICE — SET LEADWIRE 5 LEAD BEDSIDE DISPOSABLE ECG (1SET OF 5/EA)

## (undated) DEVICE — HEAD HOLDER JUNIOR/ADULT

## (undated) DEVICE — DRAPE LARGE 3 QUARTER - (20/CA)

## (undated) DEVICE — GLOVE, LITE (PAIR)

## (undated) DEVICE — TOWELS CLOTH SURGICAL - (4/PK 20PK/CA)

## (undated) DEVICE — TUBE SHILEY ENDOTRACHEAL NASAL RAE CUFFED 7.5 WITH TAPERGUARD (10EA/BX)

## (undated) DEVICE — SUTURE GENERAL

## (undated) DEVICE — TUBE SUCTION YANKAUER  1/4 X 6FT (20EA/CA)"

## (undated) DEVICE — PACK MINOR BASIN - (2EA/CA)

## (undated) DEVICE — TUBE E-T HI-LO CUFF 7.5MM (10EA/PK)

## (undated) DEVICE — NEPTUNE 4 PORT MANIFOLD - (20/PK)

## (undated) DEVICE — CANISTER SUCTION 3000ML MECHANICAL FILTER AUTO SHUTOFF MEDI-VAC NONSTERILE LF DISP  (40EA/CA)

## (undated) DEVICE — ELECTRODE 850 FOAM ADHESIVE - HYDROGEL RADIOTRNSPRNT (50/PK)

## (undated) DEVICE — GLOVE BIOGEL SZ 7.5 SURGICAL PF LTX - (50PR/BX 4BX/CA)

## (undated) DEVICE — SODIUM CHL IRRIGATION 0.9% 1000ML (12EA/CA)

## (undated) DEVICE — GLOVE BIOGEL SZ 8.5 SURGICAL PF LTX - (50PR/BX 4BX/CA)

## (undated) DEVICE — CIRCUIT VENTILATOR PEDIATRIC WITH FILTER  (20EA/CS)

## (undated) DEVICE — SUTURE 3-0 CHROMIC GUT FS-2 27 (36PK/BX)"

## (undated) DEVICE — PROTECTOR ULNA NERVE - (36PR/CA)

## (undated) DEVICE — MASK ANESTHESIA CHILD INFLATABLE CUSHION BUBBLEGUM (50EA/CS)

## (undated) DEVICE — BLADE SURGICAL #15 - (50/BX 3BX/CA)

## (undated) DEVICE — TUBE CONNECTING SUCTION - CLEAR PLASTIC STERILE 72 IN (50EA/CA)

## (undated) DEVICE — KIT  I.V. START (100EA/CA)

## (undated) DEVICE — GOWN WARMING STANDARD FLEX - (30/CA)

## (undated) DEVICE — CUTTER WIRE ANG SRR DISP

## (undated) DEVICE — LACTATED RINGERS INJ 1000 ML - (14EA/CA 60CA/PF)

## (undated) DEVICE — GLOVE BIOGEL PI INDICATOR SZ 7.0 SURGICAL PF LF - (50/BX 4BX/CA)

## (undated) DEVICE — MASK ANESTHESIA ADULT  - (100/CA)

## (undated) DEVICE — GOWN SURGEONS LARGE - (32/CA)

## (undated) DEVICE — SLEEVE, VASO, THIGH, MED

## (undated) DEVICE — GLOVE BIOGEL SZ 7 SURGICAL PF LTX - (50PR/BX 4BX/CA)

## (undated) DEVICE — DRAPE SURGICAL U 77X120 - (10/CA)

## (undated) DEVICE — WATER IRRIGATION STERILE 1000ML (12EA/CA)

## (undated) DEVICE — TUBING CLEARLINK DUO-VENT - C-FLO (48EA/CA)

## (undated) DEVICE — DRAPE MAYO STAND - (30/CA)

## (undated) DEVICE — ELECTRODE DUAL RETURN W/ CORD - (50/PK)

## (undated) DEVICE — KIT ROOM DECONTAMINATION

## (undated) DEVICE — SENSOR SPO2 NEO LNCS ADHESIVE (20/BX) SEE USER NOTES

## (undated) DEVICE — SPONGE XRAY 8X4 STERL. 12PL - (10EA/TY 80TY/CA)

## (undated) DEVICE — CANISTER SUCTION RIGID RED 1500CC (40EA/CA)